# Patient Record
Sex: MALE | Race: OTHER | NOT HISPANIC OR LATINO | URBAN - METROPOLITAN AREA
[De-identification: names, ages, dates, MRNs, and addresses within clinical notes are randomized per-mention and may not be internally consistent; named-entity substitution may affect disease eponyms.]

---

## 2022-11-01 ENCOUNTER — EMERGENCY (EMERGENCY)
Facility: HOSPITAL | Age: 87
LOS: 1 days | Discharge: HOME | End: 2022-11-01
Attending: EMERGENCY MEDICINE | Admitting: EMERGENCY MEDICINE
Payer: MEDICARE

## 2022-11-01 VITALS
TEMPERATURE: 98 F | RESPIRATION RATE: 18 BRPM | OXYGEN SATURATION: 98 % | DIASTOLIC BLOOD PRESSURE: 68 MMHG | SYSTOLIC BLOOD PRESSURE: 142 MMHG | HEART RATE: 84 BPM

## 2022-11-01 VITALS
DIASTOLIC BLOOD PRESSURE: 70 MMHG | HEIGHT: 66 IN | RESPIRATION RATE: 18 BRPM | TEMPERATURE: 98 F | WEIGHT: 139.99 LBS | HEART RATE: 100 BPM | SYSTOLIC BLOOD PRESSURE: 152 MMHG | OXYGEN SATURATION: 97 %

## 2022-11-01 LAB
BASOPHILS # BLD AUTO: 0.04 K/UL — SIGNIFICANT CHANGE UP (ref 0–0.2)
BASOPHILS NFR BLD AUTO: 0.8 % — SIGNIFICANT CHANGE UP (ref 0–2)
EOSINOPHIL # BLD AUTO: 0.06 K/UL — SIGNIFICANT CHANGE UP (ref 0–0.5)
EOSINOPHIL NFR BLD AUTO: 1.2 % — SIGNIFICANT CHANGE UP (ref 0–6)
HCT VFR BLD CALC: 31.1 % — LOW (ref 39–50)
HGB BLD-MCNC: 10.3 G/DL — LOW (ref 13–17)
IMM GRANULOCYTES NFR BLD AUTO: 0.4 % — SIGNIFICANT CHANGE UP (ref 0–0.9)
INR BLD: 2.07 RATIO — HIGH (ref 0.88–1.16)
LYMPHOCYTES # BLD AUTO: 0.76 K/UL — LOW (ref 1–3.3)
LYMPHOCYTES # BLD AUTO: 15.4 % — SIGNIFICANT CHANGE UP (ref 13–44)
MCHC RBC-ENTMCNC: 33.1 GM/DL — SIGNIFICANT CHANGE UP (ref 32–36)
MCHC RBC-ENTMCNC: 34.1 PG — HIGH (ref 27–34)
MCV RBC AUTO: 103 FL — HIGH (ref 80–100)
MONOCYTES # BLD AUTO: 0.46 K/UL — SIGNIFICANT CHANGE UP (ref 0–0.9)
MONOCYTES NFR BLD AUTO: 9.3 % — SIGNIFICANT CHANGE UP (ref 2–14)
NEUTROPHILS # BLD AUTO: 3.61 K/UL — SIGNIFICANT CHANGE UP (ref 1.8–7.4)
NEUTROPHILS NFR BLD AUTO: 72.9 % — SIGNIFICANT CHANGE UP (ref 43–77)
NRBC # BLD: 0 /100 WBCS — SIGNIFICANT CHANGE UP (ref 0–0)
PLATELET # BLD AUTO: 129 K/UL — LOW (ref 150–400)
PROTHROM AB SERPL-ACNC: 24.4 SEC — HIGH (ref 10.5–13.4)
RBC # BLD: 3.02 M/UL — LOW (ref 4.2–5.8)
RBC # FLD: 14.6 % — HIGH (ref 10.3–14.5)
WBC # BLD: 4.95 K/UL — SIGNIFICANT CHANGE UP (ref 3.8–10.5)
WBC # FLD AUTO: 4.95 K/UL — SIGNIFICANT CHANGE UP (ref 3.8–10.5)

## 2022-11-01 PROCEDURE — 99283 EMERGENCY DEPT VISIT LOW MDM: CPT

## 2022-11-01 PROCEDURE — 85610 PROTHROMBIN TIME: CPT

## 2022-11-01 PROCEDURE — 85025 COMPLETE CBC W/AUTO DIFF WBC: CPT

## 2022-11-01 PROCEDURE — 99284 EMERGENCY DEPT VISIT MOD MDM: CPT | Mod: FS

## 2022-11-01 PROCEDURE — 36415 COLL VENOUS BLD VENIPUNCTURE: CPT

## 2022-11-01 RX ORDER — TRANEXAMIC ACID 100 MG/ML
5 INJECTION, SOLUTION INTRAVENOUS ONCE
Refills: 0 | Status: DISCONTINUED | OUTPATIENT
Start: 2022-11-01 | End: 2022-11-04

## 2022-11-01 RX ORDER — OXYMETAZOLINE HYDROCHLORIDE 0.5 MG/ML
1 SPRAY NASAL ONCE
Refills: 0 | Status: COMPLETED | OUTPATIENT
Start: 2022-11-01 | End: 2022-11-01

## 2022-11-01 RX ADMIN — OXYMETAZOLINE HYDROCHLORIDE 1 SPRAY(S): 0.5 SPRAY NASAL at 08:48

## 2022-11-01 NOTE — ED ADULT NURSE NOTE - NS ED NOTE  TALK SOMEONE YN
"Chief Complaint   Patient presents with     Anxiety       Initial /75 (BP Location: Right arm, Patient Position: Sitting, Cuff Size: Adult Regular)  Pulse 109  Temp 99  F (37.2  C) (Tympanic)  Ht 5' 2\" (1.575 m)  Wt 119 lb 8 oz (54.2 kg)  BMI 21.86 kg/m2 Estimated body mass index is 21.86 kg/(m^2) as calculated from the following:    Height as of this encounter: 5' 2\" (1.575 m).    Weight as of this encounter: 119 lb 8 oz (54.2 kg).  Medication Reconciliation: complete   Renea Alas CMA  " Stable No

## 2022-11-01 NOTE — ED PROVIDER NOTE - PATIENT PORTAL LINK FT
You can access the FollowMyHealth Patient Portal offered by [facility] by registering at the following website: [email or web address]. By joining [employer]’s FollowMyHealth portal, you will also be able to view your health information using other applications (apps) compatible with our system.

## 2022-11-01 NOTE — ED PROVIDER NOTE - PROGRESS NOTE DETAILS
epistaxis resolved  afrin applied  did not need txa or packing   labs noted, discussed with pt and son in law Mr. More  Discussed the results of all diagnostic testing in ED and copies of all reports given.   Pt was given an opportunity to have all questions answered to satisfaction.  Discussed the importance of prompt, close medical follow-up. ED return precautions discussed at length.  Pt verbalizes agreement and understanding of plan and ED return precautions. Pt well appearing, stable for DC home. No emergent concerns at this time.

## 2022-11-01 NOTE — ED PROVIDER NOTE - ATTENDING APP SHARED VISIT CONTRIBUTION OF CARE
Patient came into the ED with family c/o nose bleed x 2 hours. states it started last night for a few minutes and resolved then started again today and persisted. +blood clots. is on coumadin. no lightheaded, weakness.     A&Ox3, currently no active bleeding. +dry blood in right nostril. no septal hematoma. \    will observe in the ED while awaiting labs. will pack if bleeding restarts.

## 2022-11-01 NOTE — ED PROVIDER NOTE - CARE PROVIDER_API CALL
Mina Renee)  Otolaryngology  [address], [address]  [place] [address]  Phone: [phone]  Fax: [phone]  Follow Up Time:

## 2022-11-01 NOTE — ED ADULT NURSE NOTE - NSFALLRSKUNASSIST_ED_ALL_ED
Care manager called Barbi for monthly assessment;. Message left on voicemail requesting a call back. She immediately returned my call.    Her voice is raspy. She said she is doing ok. She can't say good. Her pain is controlled using the tramadol. She started to mention a medication she is supposed to take and it being expensive, and in the middle of our conversation, her daughter was calling and she took the call.  I will call back at another time to continue conversation.    Alexandria Barrow, RN, BSN  Care manager  
no

## 2022-11-01 NOTE — ED PROVIDER NOTE - NSFOLLOWUPINSTRUCTIONS_ED_ALL_ED_FT
Nosebleed, Adult      A nosebleed is when blood comes out of the nose. Nosebleeds are common. Usually, they are not a sign of a serious condition.    Nosebleeds can happen if a blood vessel in your nose starts to bleed or if the lining of your nose (mucous membrane) cracks. They are commonly caused by:  •Allergies.      •Colds.      •Picking your nose.      •Blowing your nose too hard.      •An injury from sticking an object into your nose or getting hit in the nose.      •Dry or cold air.      Less common causes of nosebleeds include:  •Toxic fumes.      •Something abnormal in the nose or in the air-filled spaces in the bones of the face (sinuses).      •Growths in the nose, such as polyps.      •Blood thinners or conditions that cause blood to clot slowly.      •Certain illnesses or procedures that irritate or dry out the nasal passages.        Follow these instructions at home:      When you have a nosebleed:      •Sit down and tilt your head slightly forward.      •Use a clean towel or tissue to pinch your nostrils under the bony part of your nose. After 5 minutes, let go of your nose and see if bleeding starts again. Do not release pressure before that time. If there is still bleeding, repeat the pinching and holding for 5 minutes or until the bleeding stops.      • Do not place tissues or gauze in the nose to stop the bleeding.      •Avoid lying down and avoid tilting your head backward. That may make blood collect in the throat and cause gagging or coughing.      •Use a nasal spray decongestant to help with a nosebleed as told by your health care provider.      After a nosebleed:     •Avoid blowing your nose or sniffing for a number of hours.      •Avoid straining, lifting, or bending at the waist for several days. You may go back to other normal activities as you are able.    •If you are taking aspirin or blood thinners and you have nosebleeds, talk to your health care provider. These medicines make bleeding more likely.  •Ask your health care provider if you should stop taking the medicines or if you should adjust the dose.      •Do not stop taking medicines that your health care provider has recommended unless he or she tells you to stop taking them.      •If your nosebleed was caused by dry mucous membranes, use over-the-counter saline nasal spray or gel and a humidifier as told by your health care provider. This will keep the mucous membranes moist and allow them to heal. If you need to use one of these products:  •Choose one that is water-soluble.      •Use only as much as you need and use it only as often as needed.      •Do not lie down right after you use it.      •If you get nosebleeds often, talk with your health care provider about medical treatments. Options may include:  •Nasal cautery. This treatment stops and prevents nosebleeds by using a chemical swab or electrical device to lightly burn tiny blood vessels inside the nose.      •Nasal packing. A gauze or other material is placed in the nose to keep constant pressure on the bleeding area.          Contact a health care provider if you:    •Have a fever.      •Get nosebleeds often or more often than usual.      •Bruise very easily.      •Have a nosebleed from having something stuck in your nose.      •Have bleeding in your mouth.      •Vomit or cough up brown material.      •Have a nosebleed after you start a new medicine.        Get help right away if:    •You have a nosebleed after a fall or a head injury.      •Your nosebleed does not go away after 20 minutes.      •You feel dizzy or weak.      •You have unusual bleeding from other parts of your body.      •You have unusual bruising on other parts of your body.      •You become sweaty.      •You vomit blood.        Summary    •A nosebleed is when blood comes out of the nose. Common causes include allergies, an injury to the nose, or cold or dry air.      •Initial treatment includes applying pressure for 5 minutes.       •Moisturizing the nose with saline nasal spray or gel after a nosebleed may help prevent future bleeding.      •Get help right away if your nosebleed does not go away after 20 minutes.      This information is not intended to replace advice given to you by your health care provider. Make sure you discuss any questions you have with your health care provider.      Document Revised: 10/15/2020 Document Reviewed: 10/15/2020    [employer] Patient Education © 2022 [employer] Do not blow your nose.  Afrin 2 sprays each nostril for 1 week  Follow up with your ENT in 1-2 days.  PCP within 1 week  Return to the ED immediately for new or worsening symptoms as we discussed.      Nosebleed, Adult      A nosebleed is when blood comes out of the nose. Nosebleeds are common. Usually, they are not a sign of a serious condition.    Nosebleeds can happen if a blood vessel in your nose starts to bleed or if the lining of your nose (mucous membrane) cracks. They are commonly caused by:  •Allergies.      •Colds.      •Picking your nose.      •Blowing your nose too hard.      •An injury from sticking an object into your nose or getting hit in the nose.      •Dry or cold air.      Less common causes of nosebleeds include:  •Toxic fumes.      •Something abnormal in the nose or in the air-filled spaces in the bones of the face (sinuses).      •Growths in the nose, such as polyps.      •Blood thinners or conditions that cause blood to clot slowly.      •Certain illnesses or procedures that irritate or dry out the nasal passages.        Follow these instructions at home:      When you have a nosebleed:      •Sit down and tilt your head slightly forward.      •Use a clean towel or tissue to pinch your nostrils under the bony part of your nose. After 5 minutes, let go of your nose and see if bleeding starts again. Do not release pressure before that time. If there is still bleeding, repeat the pinching and holding for 5 minutes or until the bleeding stops.      • Do not place tissues or gauze in the nose to stop the bleeding.      •Avoid lying down and avoid tilting your head backward. That may make blood collect in the throat and cause gagging or coughing.      •Use a nasal spray decongestant to help with a nosebleed as told by your health care provider.      After a nosebleed:     •Avoid blowing your nose or sniffing for a number of hours.      •Avoid straining, lifting, or bending at the waist for several days. You may go back to other normal activities as you are able.    •If you are taking aspirin or blood thinners and you have nosebleeds, talk to your health care provider. These medicines make bleeding more likely.  •Ask your health care provider if you should stop taking the medicines or if you should adjust the dose.      •Do not stop taking medicines that your health care provider has recommended unless he or she tells you to stop taking them.      •If your nosebleed was caused by dry mucous membranes, use over-the-counter saline nasal spray or gel and a humidifier as told by your health care provider. This will keep the mucous membranes moist and allow them to heal. If you need to use one of these products:  •Choose one that is water-soluble.      •Use only as much as you need and use it only as often as needed.      •Do not lie down right after you use it.      •If you get nosebleeds often, talk with your health care provider about medical treatments. Options may include:  •Nasal cautery. This treatment stops and prevents nosebleeds by using a chemical swab or electrical device to lightly burn tiny blood vessels inside the nose.      •Nasal packing. A gauze or other material is placed in the nose to keep constant pressure on the bleeding area.          Contact a health care provider if you:    •Have a fever.      •Get nosebleeds often or more often than usual.      •Bruise very easily.      •Have a nosebleed from having something stuck in your nose.      •Have bleeding in your mouth.      •Vomit or cough up brown material.      •Have a nosebleed after you start a new medicine.        Get help right away if:    •You have a nosebleed after a fall or a head injury.      •Your nosebleed does not go away after 20 minutes.      •You feel dizzy or weak.      •You have unusual bleeding from other parts of your body.      •You have unusual bruising on other parts of your body.      •You become sweaty.      •You vomit blood.        Summary    •A nosebleed is when blood comes out of the nose. Common causes include allergies, an injury to the nose, or cold or dry air.      •Initial treatment includes applying pressure for 5 minutes.       •Moisturizing the nose with saline nasal spray or gel after a nosebleed may help prevent future bleeding.      •Get help right away if your nosebleed does not go away after 20 minutes.      This information is not intended to replace advice given to you by your health care provider. Make sure you discuss any questions you have with your health care provider.      Document Revised: 10/15/2020 Document Reviewed: 10/15/2020    [employer] Patient Education © 2022 [employer]

## 2022-11-01 NOTE — ED ADULT NURSE NOTE - OBJECTIVE STATEMENT
received pt with epistaxis, pt denies injury/trauma.   bleeding well controlled with pressure at this time.   Respirations even and unlabored denies n/v.    previously assessed by ed provider and afrin administered as documented. BN

## 2022-11-01 NOTE — ED PROVIDER NOTE - OBJECTIVE STATEMENT
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 Pt reports epistaxis last night for just a few mins, resolved with pressure. Pt denies HA, dizziness, palpitations, SOB, fever/chills, cough.

## 2022-12-28 ENCOUNTER — OFFICE (OUTPATIENT)
Dept: URBAN - METROPOLITAN AREA CLINIC 109 | Facility: CLINIC | Age: 87
Setting detail: OPHTHALMOLOGY
End: 2022-12-28
Payer: MEDICARE

## 2022-12-28 DIAGNOSIS — H02.831: ICD-10-CM

## 2022-12-28 DIAGNOSIS — H02.835: ICD-10-CM

## 2022-12-28 DIAGNOSIS — H02.403: ICD-10-CM

## 2022-12-28 DIAGNOSIS — H16.222: ICD-10-CM

## 2022-12-28 DIAGNOSIS — H16.223: ICD-10-CM

## 2022-12-28 DIAGNOSIS — H02.834: ICD-10-CM

## 2022-12-28 DIAGNOSIS — H35.3131: ICD-10-CM

## 2022-12-28 DIAGNOSIS — H02.832: ICD-10-CM

## 2022-12-28 DIAGNOSIS — H16.221: ICD-10-CM

## 2022-12-28 PROCEDURE — 99213 OFFICE O/P EST LOW 20 MIN: CPT | Performed by: OPHTHALMOLOGY

## 2022-12-28 ASSESSMENT — LID POSITION - PTOSIS
OS_PTOSIS: LUL 1+ 2+
OD_PTOSIS: RUL 1+ 2+

## 2022-12-28 ASSESSMENT — VISUAL ACUITY
OS_BCVA: 20/20-1
OD_BCVA: 20/20

## 2022-12-28 ASSESSMENT — SUPERFICIAL PUNCTATE KERATITIS (SPK)
OS_SPK: ABSENT
OD_SPK: ABSENT

## 2022-12-28 ASSESSMENT — TONOMETRY
OD_IOP_MMHG: 13
OS_IOP_MMHG: 14

## 2022-12-28 ASSESSMENT — LID POSITION - COMMENTS
OD_COMMENTS: GOOD LEVATOR FUNCTION
OS_COMMENTS: GOOD LEVATOR FUNCTION

## 2022-12-28 ASSESSMENT — LID POSITION - DERMATOCHALASIS
OS_DERMATOCHALASIS: 1+
OD_DERMATOCHALASIS: 1+

## 2022-12-28 ASSESSMENT — CONFRONTATIONAL VISUAL FIELD TEST (CVF)
OD_FINDINGS: FULL
OS_FINDINGS: FULL

## 2023-04-26 NOTE — ED PROVIDER NOTE - NSDCPRINTRESULTS_ED_ALL_ED
Drysol Pregnancy And Lactation Text: This medication is considered safe during pregnancy and breast feeding. Patient requests all Lab, Cardiology, and Radiology Results on their Discharge Instructions

## 2023-06-28 ENCOUNTER — OFFICE (OUTPATIENT)
Dept: URBAN - METROPOLITAN AREA CLINIC 109 | Facility: CLINIC | Age: 88
Setting detail: OPHTHALMOLOGY
End: 2023-06-28
Payer: MEDICARE

## 2023-06-28 DIAGNOSIS — H02.403: ICD-10-CM

## 2023-06-28 DIAGNOSIS — H16.223: ICD-10-CM

## 2023-06-28 DIAGNOSIS — H02.835: ICD-10-CM

## 2023-06-28 DIAGNOSIS — H16.222: ICD-10-CM

## 2023-06-28 DIAGNOSIS — H35.3131: ICD-10-CM

## 2023-06-28 DIAGNOSIS — H02.834: ICD-10-CM

## 2023-06-28 DIAGNOSIS — H02.832: ICD-10-CM

## 2023-06-28 DIAGNOSIS — H16.221: ICD-10-CM

## 2023-06-28 DIAGNOSIS — H02.831: ICD-10-CM

## 2023-06-28 PROCEDURE — 99213 OFFICE O/P EST LOW 20 MIN: CPT | Performed by: OPHTHALMOLOGY

## 2023-06-28 ASSESSMENT — SUPERFICIAL PUNCTATE KERATITIS (SPK)
OS_SPK: ABSENT
OD_SPK: ABSENT

## 2023-06-28 ASSESSMENT — VISUAL ACUITY
OD_BCVA: 20/20-2
OS_BCVA: 20/20-1

## 2023-06-28 ASSESSMENT — CONFRONTATIONAL VISUAL FIELD TEST (CVF)
OS_FINDINGS: FULL
OD_FINDINGS: FULL

## 2023-06-28 ASSESSMENT — TONOMETRY
OD_IOP_MMHG: 11
OS_IOP_MMHG: 11

## 2023-06-28 ASSESSMENT — LID POSITION - COMMENTS
OD_COMMENTS: GOOD LEVATOR FUNCTION
OS_COMMENTS: GOOD LEVATOR FUNCTION

## 2023-06-28 ASSESSMENT — LID POSITION - DERMATOCHALASIS
OS_DERMATOCHALASIS: 1+
OD_DERMATOCHALASIS: 1+

## 2023-06-28 ASSESSMENT — LID POSITION - PTOSIS
OD_PTOSIS: RUL 1+ 2+
OS_PTOSIS: LUL 1+ 2+

## 2023-12-19 ENCOUNTER — OFFICE (OUTPATIENT)
Dept: URBAN - METROPOLITAN AREA CLINIC 109 | Facility: CLINIC | Age: 88
Setting detail: OPHTHALMOLOGY
End: 2023-12-19
Payer: MEDICARE

## 2023-12-19 DIAGNOSIS — H16.223: ICD-10-CM

## 2023-12-19 DIAGNOSIS — H16.222: ICD-10-CM

## 2023-12-19 DIAGNOSIS — H16.221: ICD-10-CM

## 2023-12-19 DIAGNOSIS — H02.831: ICD-10-CM

## 2023-12-19 DIAGNOSIS — H35.3131: ICD-10-CM

## 2023-12-19 DIAGNOSIS — H02.403: ICD-10-CM

## 2023-12-19 DIAGNOSIS — H02.835: ICD-10-CM

## 2023-12-19 DIAGNOSIS — H02.834: ICD-10-CM

## 2023-12-19 DIAGNOSIS — H02.832: ICD-10-CM

## 2023-12-19 PROCEDURE — 99213 OFFICE O/P EST LOW 20 MIN: CPT | Performed by: OPHTHALMOLOGY

## 2023-12-19 ASSESSMENT — LID POSITION - COMMENTS
OD_COMMENTS: GOOD LEVATOR FUNCTION
OS_COMMENTS: GOOD LEVATOR FUNCTION

## 2023-12-19 ASSESSMENT — REFRACTION_AUTOREFRACTION
OD_SPHERE: +0.50
OD_AXIS: 012
OD_CYLINDER: -1.00
OS_AXIS: 097
OS_SPHERE: +0.75
OS_CYLINDER: -0.75

## 2023-12-19 ASSESSMENT — SUPERFICIAL PUNCTATE KERATITIS (SPK)
OS_SPK: ABSENT
OD_SPK: ABSENT

## 2023-12-19 ASSESSMENT — LID POSITION - PTOSIS
OD_PTOSIS: RUL 1+ 2+
OS_PTOSIS: LUL 1+ 2+

## 2023-12-19 ASSESSMENT — LID POSITION - DERMATOCHALASIS
OS_DERMATOCHALASIS: 1+
OD_DERMATOCHALASIS: 1+

## 2023-12-19 ASSESSMENT — CONFRONTATIONAL VISUAL FIELD TEST (CVF)
OD_FINDINGS: FULL
OS_FINDINGS: FULL

## 2023-12-19 ASSESSMENT — SPHEQUIV_DERIVED
OS_SPHEQUIV: 0.375
OD_SPHEQUIV: 0

## 2024-05-09 ENCOUNTER — APPOINTMENT (OUTPATIENT)
Dept: CARDIOTHORACIC SURGERY | Facility: CLINIC | Age: 89
End: 2024-05-09

## 2024-05-09 PROBLEM — Z00.00 ENCOUNTER FOR PREVENTIVE HEALTH EXAMINATION: Status: ACTIVE | Noted: 2024-05-09

## 2024-06-15 PROBLEM — I10 ESSENTIAL (PRIMARY) HYPERTENSION: Chronic | Status: ACTIVE | Noted: 2022-11-01

## 2024-06-15 PROBLEM — N40.0 BENIGN PROSTATIC HYPERPLASIA WITHOUT LOWER URINARY TRACT SYMPTOMS: Chronic | Status: ACTIVE | Noted: 2022-11-01

## 2024-06-18 ENCOUNTER — OFFICE (OUTPATIENT)
Dept: URBAN - METROPOLITAN AREA CLINIC 109 | Facility: CLINIC | Age: 89
Setting detail: OPHTHALMOLOGY
End: 2024-06-18
Payer: MEDICARE

## 2024-06-18 DIAGNOSIS — H16.223: ICD-10-CM

## 2024-06-18 DIAGNOSIS — H16.221: ICD-10-CM

## 2024-06-18 DIAGNOSIS — H02.403: ICD-10-CM

## 2024-06-18 DIAGNOSIS — H16.222: ICD-10-CM

## 2024-06-18 DIAGNOSIS — H35.3131: ICD-10-CM

## 2024-06-18 DIAGNOSIS — H02.834: ICD-10-CM

## 2024-06-18 DIAGNOSIS — H02.831: ICD-10-CM

## 2024-06-18 DIAGNOSIS — H02.835: ICD-10-CM

## 2024-06-18 DIAGNOSIS — H02.832: ICD-10-CM

## 2024-06-18 PROCEDURE — 99213 OFFICE O/P EST LOW 20 MIN: CPT | Performed by: OPHTHALMOLOGY

## 2024-06-18 ASSESSMENT — LID POSITION - COMMENTS
OS_COMMENTS: GOOD LEVATOR FUNCTION
OD_COMMENTS: GOOD LEVATOR FUNCTION

## 2024-06-18 ASSESSMENT — CONFRONTATIONAL VISUAL FIELD TEST (CVF)
OD_FINDINGS: FULL
OS_FINDINGS: FULL

## 2024-06-18 ASSESSMENT — LID POSITION - DERMATOCHALASIS
OS_DERMATOCHALASIS: 1+
OD_DERMATOCHALASIS: 1+

## 2024-06-18 ASSESSMENT — LID POSITION - PTOSIS
OS_PTOSIS: LUL 1+ 2+
OD_PTOSIS: RUL 1+ 2+

## 2024-06-20 ENCOUNTER — APPOINTMENT (OUTPATIENT)
Dept: CV DIAGNOSITCS | Facility: HOSPITAL | Age: 89
End: 2024-06-20

## 2024-06-20 ENCOUNTER — RESULT REVIEW (OUTPATIENT)
Age: 89
End: 2024-06-20

## 2024-06-20 ENCOUNTER — OUTPATIENT (OUTPATIENT)
Dept: OUTPATIENT SERVICES | Facility: HOSPITAL | Age: 89
LOS: 1 days | End: 2024-06-20
Payer: MEDICARE

## 2024-06-20 VITALS
DIASTOLIC BLOOD PRESSURE: 72 MMHG | OXYGEN SATURATION: 97 % | SYSTOLIC BLOOD PRESSURE: 152 MMHG | HEIGHT: 64 IN | WEIGHT: 139.99 LBS | RESPIRATION RATE: 16 BRPM | HEART RATE: 74 BPM

## 2024-06-20 VITALS
SYSTOLIC BLOOD PRESSURE: 166 MMHG | RESPIRATION RATE: 14 BRPM | OXYGEN SATURATION: 98 % | HEART RATE: 66 BPM | DIASTOLIC BLOOD PRESSURE: 72 MMHG

## 2024-06-20 DIAGNOSIS — I34.0 NONRHEUMATIC MITRAL (VALVE) INSUFFICIENCY: ICD-10-CM

## 2024-06-20 PROCEDURE — 76377 3D RENDER W/INTRP POSTPROCES: CPT

## 2024-06-20 PROCEDURE — 76377 3D RENDER W/INTRP POSTPROCES: CPT | Mod: 26

## 2024-06-20 PROCEDURE — 93325 DOPPLER ECHO COLOR FLOW MAPG: CPT | Mod: 26

## 2024-06-20 PROCEDURE — 93312 ECHO TRANSESOPHAGEAL: CPT

## 2024-06-20 PROCEDURE — 93320 DOPPLER ECHO COMPLETE: CPT

## 2024-06-20 PROCEDURE — 93325 DOPPLER ECHO COLOR FLOW MAPG: CPT

## 2024-06-20 PROCEDURE — 93312 ECHO TRANSESOPHAGEAL: CPT | Mod: 26

## 2024-06-20 PROCEDURE — 93320 DOPPLER ECHO COMPLETE: CPT | Mod: 26

## 2024-06-26 PROBLEM — N40.1 BPH ASSOCIATED WITH NOCTURIA: Status: ACTIVE | Noted: 2024-06-26

## 2024-06-26 PROBLEM — N18.9 CKD (CHRONIC KIDNEY DISEASE): Status: ACTIVE | Noted: 2024-06-26

## 2024-06-26 PROBLEM — Z78.9 SOCIAL ALCOHOL USE: Status: ACTIVE | Noted: 2024-06-26

## 2024-06-26 PROBLEM — I34.0 MITRAL VALVE INSUFFICIENCY, UNSPECIFIED ETIOLOGY: Status: ACTIVE | Noted: 2024-06-18

## 2024-06-26 PROBLEM — Z86.711 HISTORY OF PULMONARY EMBOLISM: Status: RESOLVED | Noted: 2024-06-26 | Resolved: 2024-06-26

## 2024-06-26 PROBLEM — Z80.0 FAMILY HISTORY OF MALIGNANT NEOPLASM OF COLON: Status: ACTIVE | Noted: 2024-06-26

## 2024-06-26 PROBLEM — Z87.891 FORMER SMOKER: Status: ACTIVE | Noted: 2024-06-26

## 2024-06-26 RX ORDER — LISINOPRIL 20 MG/1
20 TABLET ORAL DAILY
Qty: 90 | Refills: 0 | Status: ACTIVE | COMMUNITY

## 2024-06-26 RX ORDER — TERAZOSIN 5 MG/1
5 CAPSULE ORAL
Refills: 0 | Status: ACTIVE | COMMUNITY

## 2024-06-26 RX ORDER — ATENOLOL 25 MG/1
25 TABLET ORAL DAILY
Qty: 30 | Refills: 0 | Status: ACTIVE | COMMUNITY

## 2024-06-26 RX ORDER — OMEPRAZOLE 20 MG/1
20 CAPSULE, DELAYED RELEASE ORAL
Qty: 90 | Refills: 0 | Status: ACTIVE | COMMUNITY

## 2024-06-27 ENCOUNTER — APPOINTMENT (OUTPATIENT)
Dept: CARDIOTHORACIC SURGERY | Facility: CLINIC | Age: 89
End: 2024-06-27
Payer: MEDICARE

## 2024-06-27 VITALS
OXYGEN SATURATION: 95 % | RESPIRATION RATE: 14 BRPM | HEIGHT: 64 IN | HEART RATE: 61 BPM | BODY MASS INDEX: 23.9 KG/M2 | DIASTOLIC BLOOD PRESSURE: 65 MMHG | SYSTOLIC BLOOD PRESSURE: 127 MMHG | WEIGHT: 140 LBS | TEMPERATURE: 97.2 F

## 2024-06-27 DIAGNOSIS — N18.9 CHRONIC KIDNEY DISEASE, UNSPECIFIED: ICD-10-CM

## 2024-06-27 DIAGNOSIS — I34.0 NONRHEUMATIC MITRAL (VALVE) INSUFFICIENCY: ICD-10-CM

## 2024-06-27 DIAGNOSIS — Z78.9 OTHER SPECIFIED HEALTH STATUS: ICD-10-CM

## 2024-06-27 DIAGNOSIS — Z87.891 PERSONAL HISTORY OF NICOTINE DEPENDENCE: ICD-10-CM

## 2024-06-27 DIAGNOSIS — Z80.0 FAMILY HISTORY OF MALIGNANT NEOPLASM OF DIGESTIVE ORGANS: ICD-10-CM

## 2024-06-27 DIAGNOSIS — Z86.711 PERSONAL HISTORY OF PULMONARY EMBOLISM: ICD-10-CM

## 2024-06-27 DIAGNOSIS — N40.1 BENIGN PROSTATIC HYPERPLASIA WITH LOWER URINARY TRACT SYMPMS: ICD-10-CM

## 2024-06-27 DIAGNOSIS — R35.1 BENIGN PROSTATIC HYPERPLASIA WITH LOWER URINARY TRACT SYMPMS: ICD-10-CM

## 2024-06-27 PROCEDURE — 99204 OFFICE O/P NEW MOD 45 MIN: CPT

## 2024-07-22 ENCOUNTER — RESULT REVIEW (OUTPATIENT)
Age: 89
End: 2024-07-22

## 2024-07-22 RX ORDER — CEFUROXIME AXETIL 250 MG
1500 TABLET ORAL ONCE
Refills: 0 | Status: DISCONTINUED | OUTPATIENT
Start: 2024-07-31 | End: 2024-08-01

## 2024-07-22 RX ORDER — BACTERIOSTATIC SODIUM CHLORIDE 0.9 %
3 VIAL (ML) INJECTION EVERY 8 HOURS
Refills: 0 | Status: DISCONTINUED | OUTPATIENT
Start: 2024-07-31 | End: 2024-07-31

## 2024-07-22 RX ORDER — CHLORHEXIDINE GLUCONATE 500 MG/1
1 CLOTH TOPICAL ONCE
Refills: 0 | Status: DISCONTINUED | OUTPATIENT
Start: 2024-07-31 | End: 2024-07-31

## 2024-07-30 PROBLEM — Z79.01 LONG TERM (CURRENT) USE OF ANTICOAGULANTS: Chronic | Status: ACTIVE | Noted: 2024-07-22

## 2024-07-30 PROBLEM — I26.99 OTHER PULMONARY EMBOLISM WITHOUT ACUTE COR PULMONALE: Chronic | Status: ACTIVE | Noted: 2024-07-22

## 2024-07-30 PROBLEM — Z86.711 PERSONAL HISTORY OF PULMONARY EMBOLISM: Chronic | Status: ACTIVE | Noted: 2024-07-22

## 2024-07-30 PROBLEM — N18.9 CHRONIC KIDNEY DISEASE, UNSPECIFIED: Chronic | Status: ACTIVE | Noted: 2024-07-22

## 2024-07-31 ENCOUNTER — INPATIENT (INPATIENT)
Facility: HOSPITAL | Age: 89
LOS: 0 days | Discharge: HOME | DRG: 267 | End: 2024-08-01
Attending: THORACIC SURGERY (CARDIOTHORACIC VASCULAR SURGERY) | Admitting: THORACIC SURGERY (CARDIOTHORACIC VASCULAR SURGERY)
Payer: MEDICARE

## 2024-07-31 ENCOUNTER — RESULT REVIEW (OUTPATIENT)
Age: 89
End: 2024-07-31

## 2024-07-31 ENCOUNTER — APPOINTMENT (OUTPATIENT)
Dept: CARDIOTHORACIC SURGERY | Facility: HOSPITAL | Age: 89
End: 2024-07-31

## 2024-07-31 VITALS
WEIGHT: 135.8 LBS | SYSTOLIC BLOOD PRESSURE: 164 MMHG | DIASTOLIC BLOOD PRESSURE: 77 MMHG | OXYGEN SATURATION: 95 % | HEIGHT: 63.78 IN | RESPIRATION RATE: 17 BRPM | HEART RATE: 78 BPM | TEMPERATURE: 98 F

## 2024-07-31 DIAGNOSIS — Z98.890 OTHER SPECIFIED POSTPROCEDURAL STATES: Chronic | ICD-10-CM

## 2024-07-31 DIAGNOSIS — I34.0 NONRHEUMATIC MITRAL (VALVE) INSUFFICIENCY: ICD-10-CM

## 2024-07-31 DIAGNOSIS — Z95.828 PRESENCE OF OTHER VASCULAR IMPLANTS AND GRAFTS: Chronic | ICD-10-CM

## 2024-07-31 LAB
GLUCOSE BLDC GLUCOMTR-MCNC: 98 MG/DL — SIGNIFICANT CHANGE UP (ref 70–99)
INR BLD: 1.21 RATIO — HIGH (ref 0.85–1.18)
PROTHROM AB SERPL-ACNC: 13.2 SEC — HIGH (ref 9.5–13)

## 2024-07-31 PROCEDURE — 93010 ELECTROCARDIOGRAM REPORT: CPT

## 2024-07-31 PROCEDURE — 99232 SBSQ HOSP IP/OBS MODERATE 35: CPT | Mod: FS

## 2024-07-31 PROCEDURE — 93355 ECHO TRANSESOPHAGEAL (TEE): CPT

## 2024-07-31 DEVICE — SYS IMP PASCAL ACE G180266 STUDY ONLY: Type: IMPLANTABLE DEVICE | Status: FUNCTIONAL

## 2024-07-31 DEVICE — SHEATH INTRODUCER TERUMO PINNACLE CORONARY 8FR X 10CM X 0.038" MINI WIRE: Type: IMPLANTABLE DEVICE | Status: FUNCTIONAL

## 2024-07-31 DEVICE — KIT A-LINE 1LUM 20G X 12CM SAFE KIT: Type: IMPLANTABLE DEVICE | Status: FUNCTIONAL

## 2024-07-31 DEVICE — SHEATH INTRODUCER TERUMO PINNACLE CORONARY 5FR X 10CM X 0.038" MINI WIRE: Type: IMPLANTABLE DEVICE | Status: FUNCTIONAL

## 2024-07-31 DEVICE — IMPLANTABLE DEVICE: Type: IMPLANTABLE DEVICE | Status: FUNCTIONAL

## 2024-07-31 DEVICE — SHEATH GD WITH INTRODUCER: Type: IMPLANTABLE DEVICE | Status: FUNCTIONAL

## 2024-07-31 DEVICE — SYS IMP PASCAL ACE: Type: IMPLANTABLE DEVICE | Status: FUNCTIONAL

## 2024-07-31 RX ORDER — WARFARIN SODIUM 4 MG/1
5 TABLET ORAL ONCE
Refills: 0 | Status: COMPLETED | OUTPATIENT
Start: 2024-07-31 | End: 2024-07-31

## 2024-07-31 RX ORDER — CEFUROXIME AXETIL 250 MG
1500 TABLET ORAL EVERY 8 HOURS
Refills: 0 | Status: COMPLETED | OUTPATIENT
Start: 2024-08-01 | End: 2024-08-01

## 2024-07-31 RX ADMIN — Medication 3 MILLILITER(S): at 22:00

## 2024-07-31 RX ADMIN — WARFARIN SODIUM 5 MILLIGRAM(S): 4 TABLET ORAL at 21:46

## 2024-07-31 NOTE — PROGRESS NOTE ADULT - ASSESSMENT
This is a 89 year old male with past medical history of [place], HLD, PE maintained on Coumadin S/P Chiki filter (2008) managed by PCP every 3 weeks INR checks, tricupsid valve repair (2008), pulmonary artery thrombosis s/p thrombectomy (2008) and known MR followed by cardiologist, CKD, and BPH. Reports having noticed increasing SOB over the last few months and ankle swelling. Today presenting to PST accompanied with daughter for scheduled Mitral Clip on 7/31/24 with Dr. Casas    7/31 s/p Mitral jem    7/31: Now s/p 2 successful mitral clips (KENYETTA ace) via RFA. Hemostasis achieved w/ perstring, RRA a-line removed and radial band in place to be removed in CRS  - Post procedure EKG: accelerated junctional rhythm 74bpm  - s/p Zinacef intra-procedure; c/w Zinacef 1.5g q8hrs x2 doses (ordered)  - Coumadin 5mg to be given tonight (ordered)  - Ofirmev administered at 1800  - Remove RFA perstring in AM  - Resume home meds in AM if able to tolerate  - TTE/MCOT in AM This is a 89 year old male with past medical history of [place], HLD, PE maintained on Coumadin S/P Chiki filter (2008) managed by PCP every 3 weeks INR checks, tricupsid valve repair (2008), pulmonary artery thrombosis s/p thrombectomy (2008) and known MR followed by cardiologist, CKD, and BPH. Reports having noticed increasing SOB over the last few months and ankle swelling. Today presenting to PST accompanied with daughter for scheduled Mitral Clip on 7/31/24 with Dr. Casas    7/31 s/p 2 successful mitral clips (KENYETTA ace) via RFA. Hemostasis achieved w/ perstring, RRA a-line removed and radial band in place to be removed in CRS, Post procedure EKG: accelerated junctional rhythm 74bpm, s/p Zinacef intra-procedure; c/w Zinacef 1.5g q8hrs x2 doses (ordered), Coumadin 5mg to be given tonight (ordered) per CRS, RFA perstring to be removed in the AM, Resume home meds in AM if able to tolerate, TTE/EKG/MCOT in AM

## 2024-07-31 NOTE — PRE-ANESTHESIA EVALUATION ADULT - NSANTHPMHFT_GEN_ALL_CORE
This is a 89 year old male with past medical history of [place], HLD, PE maintained on Coumadin S/P Chiki filter (2008) managed by PCP every 3 weeks INR checks, tricupsid valve repair (2008), pulmonary artery thrombosis s/p thrombectomy (2008) and known MR followed by cardiologist, CKD, and BPH. Reports having noticed increasing SOB over the last few months and ankle swelling.

## 2024-07-31 NOTE — PATIENT PROFILE ADULT - ARRIVAL FROM
[FreeTextEntry1] : Urine HCG negative today; results given. \par Satisfied with BCM; tolerating well. \par Advised consistent condom use for STI prevention.\par Advised to f/u if heavy bleeding or prolonged bleeding > 2 weeks. \par Advised to rest left wrist, nsaid prn (declined today). RTC if pain persists in a week. 
Home

## 2024-07-31 NOTE — PROGRESS NOTE ADULT - SUBJECTIVE AND OBJECTIVE BOX
Subjective: ***     Telemetry:  ***  Overnight Events: no acute events    Vital Signs Last 24 Hrs  T(C): 36.6 (07-31-24 @ 19:50), Max: 36.6 (07-31-24 @ 19:50)  T(F): 97.8 (07-31-24 @ 19:50), Max: 97.8 (07-31-24 @ 19:50)  HR: 56 (07-31-24 @ 19:50) (56 - 78)  BP: 138/78 (07-31-24 @ 19:50) (137/65 - 164/77)  RR: 18 (07-31-24 @ 19:50) (14 - 18)  SpO2: 97% (07-31-24 @ 19:50) (93% - 98%)             Daily Height in cm: 162 (31 Jul 2024 15:15)    Daily   Admit Wt: Drug Dosing Weight  Height (cm): 162 (31 Jul 2024 15:15)  Weight (kg): 61.6 (31 Jul 2024 15:15)  BMI (kg/m2): 23.5 (31 Jul 2024 15:15)  BSA (m2): 1.66 (31 Jul 2024 15:15)    CAPILLARY BLOOD GLUCOSE    POCT Blood Glucose.: 98 mg/dL (31 Jul 2024 07:51)        cefuroxime  IVPB 1500 milliGRAM(s) IV Intermittent once  warfarin 5 milliGRAM(s) Oral once    PT/INR - ( 31 Jul 2024 08:21 )   PT: 13.2 sec;   INR: 1.21 ratio      PHYSICAL EXAM  Neurology: A&Ox3, NAD  CV : RRR+S1S2  Sternal Wound: MSI CDI , Stable  Lungs: Respirations non-labored, B/L BS clear  Abdomen: Soft, NT/ND, +BSx4Q   : DTV  Extremities: negative B/L LE edema, negative calf tenderness, +PP B/L, b/l groins soft no hematoma, Right groin dressign cdi w/ suture noted    Disposition:  TBD[  ]     Home[  ]     Rehab[  ]      OR[  ] Date:              Subjective: Patient seen and examine at bedside, Deniesa any current SOB, chest pains, headaches, abdominal pain, fevers, chills, N/V/D     Telemetry: SR 60's  Overnight Events: no acute events    Vital Signs Last 24 Hrs  T(C): 36.6 (07-31-24 @ 19:50), Max: 36.6 (07-31-24 @ 19:50)  T(F): 97.8 (07-31-24 @ 19:50), Max: 97.8 (07-31-24 @ 19:50)  HR: 56 (07-31-24 @ 19:50) (56 - 78)  BP: 138/78 (07-31-24 @ 19:50) (137/65 - 164/77)  RR: 18 (07-31-24 @ 19:50) (14 - 18)  SpO2: 97% (07-31-24 @ 19:50) (93% - 98%)             Daily Height in cm: 162 (31 Jul 2024 15:15)    Daily   Admit Wt: Drug Dosing Weight  Height (cm): 162 (31 Jul 2024 15:15)  Weight (kg): 61.6 (31 Jul 2024 15:15)  BMI (kg/m2): 23.5 (31 Jul 2024 15:15)  BSA (m2): 1.66 (31 Jul 2024 15:15)    CAPILLARY BLOOD GLUCOSE    POCT Blood Glucose.: 98 mg/dL (31 Jul 2024 07:51)        cefuroxime  IVPB 1500 milliGRAM(s) IV Intermittent once  warfarin 5 milliGRAM(s) Oral once    PT/INR - ( 31 Jul 2024 08:21 )   PT: 13.2 sec;   INR: 1.21 ratio      PHYSICAL EXAM  Neurology: A&Ox3, NAD  CV : RRR+S1S2  Sternal Wound: MSI CDI , Stable  Lungs: Respirations non-labored, B/L BS clear  Abdomen: Soft, NT/ND, +BSx4Q   : DTV  Extremities: negative B/L LE edema, negative calf tenderness, +PP B/L, b/l groins soft no hematoma, Right groin dressign cdi w/ suture noted    Disposition:  TBD[  ]     Home[  ]     Rehab[  ]      OR[  ] Date:

## 2024-07-31 NOTE — PROGRESS NOTE ADULT - PROBLEM SELECTOR PLAN 1
Zinacef x 2 more doses  Coumadin 5mg to be given tonight (ordered) per CRS  RFA perstring to be removed in the AM  Monitor b/l groins   Resume home meds in AM if able to tolerate  TTE/EKG/MCOT in AM

## 2024-07-31 NOTE — PROGRESS NOTE ADULT - SUBJECTIVE AND OBJECTIVE BOX
89 y/o [place] Male w/ PMHx of HLD, PE s/p Chiki filter (2008), s/p tricuspid valve repair 2008 c/b cardiac arrest 2/2 pulmonary artery thrombosis s/p thrombectomy, known MR, CKD, BPH presents for mitral clip.    Vital Signs Last 24 Hrs  T(C): 36.5 (31 Jul 2024 15:15), Max: 36.5 (31 Jul 2024 09:06)  T(F): 97.7 (31 Jul 2024 09:06), Max: 97.7 (31 Jul 2024 09:06)  HR: 72 (31 Jul 2024 18:50) (66 - 78)  BP: 143/64 (31 Jul 2024 18:50) (137/65 - 164/77)  BP(mean): --  RR: 16 (31 Jul 2024 18:50) (14 - 17)  SpO2: 93% (31 Jul 2024 18:50) (93% - 98%)    Parameters below as of 31 Jul 2024 18:50  Patient On (Oxygen Delivery Method): room air    Limited physical examination:  General: awake, alert  Vascular: right groin stable; soft and no hematoma; perstring in place; + pulses    Assessment:  7/31: Now s/p 2 successful mitral clips (KENYETTA ace) via RFA. Hemostasis achieved w/ perstring, RRA a-line removed and radial band in place to be removed in CRS  - s/p Zinacef intra-procedure; c/w Zinacef 1.5g q8hrs x2 doses (ordered)  - Coumadin 5mg to be given tonight (ordered)  - Ofirmev administered at 1800  - Remove RFA perstring in AM  - Resume home meds in AM if able to tolerate  - TTE/MCOT in AM    Rosy Flores PA-C  Invasive cardiology  [id number] 89 y/o [place] Male w/ PMHx of HLD, PE s/p Chiki filter (2008), s/p tricuspid valve repair 2008 c/b cardiac arrest 2/2 pulmonary artery thrombosis s/p thrombectomy, known MR, CKD, BPH presents for mitral clip.    Vital Signs Last 24 Hrs  T(C): 36.5 (31 Jul 2024 15:15), Max: 36.5 (31 Jul 2024 09:06)  T(F): 97.7 (31 Jul 2024 09:06), Max: 97.7 (31 Jul 2024 09:06)  HR: 72 (31 Jul 2024 18:50) (66 - 78)  BP: 143/64 (31 Jul 2024 18:50) (137/65 - 164/77)  BP(mean): --  RR: 16 (31 Jul 2024 18:50) (14 - 17)  SpO2: 93% (31 Jul 2024 18:50) (93% - 98%)    Parameters below as of 31 Jul 2024 18:50  Patient On (Oxygen Delivery Method): room air    Limited physical examination:  General: awake, alert  Vascular: right groin stable; soft and no hematoma; perstring in place; + pulses    Assessment:  7/31: Now s/p 2 successful mitral clips (KENYETTA ace) via RFA. Hemostasis achieved w/ perstring, RRA a-line removed and radial band in place to be removed in CRS  - Post procedure EKG: accelerated junctional rhythm 74bpm  - s/p Zinacef intra-procedure; c/w Zinacef 1.5g q8hrs x2 doses (ordered)  - Coumadin 5mg to be given tonight (ordered)  - Ofirmev administered at 1800  - Remove RFA perstring in AM  - Resume home meds in AM if able to tolerate  - TTE/MCOT in AM    ELADIO GreenC  Invasive cardiology  [phone]

## 2024-08-01 ENCOUNTER — TRANSCRIPTION ENCOUNTER (OUTPATIENT)
Age: 89
End: 2024-08-01

## 2024-08-01 ENCOUNTER — RESULT REVIEW (OUTPATIENT)
Age: 89
End: 2024-08-01

## 2024-08-01 VITALS
SYSTOLIC BLOOD PRESSURE: 133 MMHG | RESPIRATION RATE: 18 BRPM | TEMPERATURE: 99 F | OXYGEN SATURATION: 93 % | DIASTOLIC BLOOD PRESSURE: 64 MMHG | HEART RATE: 81 BPM

## 2024-08-01 DIAGNOSIS — Z98.890 OTHER SPECIFIED POSTPROCEDURAL STATES: ICD-10-CM

## 2024-08-01 LAB
ANION GAP SERPL CALC-SCNC: 11 MMOL/L — SIGNIFICANT CHANGE UP (ref 5–17)
BUN SERPL-MCNC: 23 MG/DL — SIGNIFICANT CHANGE UP (ref 7–23)
CALCIUM SERPL-MCNC: 9.1 MG/DL — SIGNIFICANT CHANGE UP (ref 8.4–10.5)
CHLORIDE SERPL-SCNC: 106 MMOL/L — SIGNIFICANT CHANGE UP (ref 96–108)
CO2 SERPL-SCNC: 23 MMOL/L — SIGNIFICANT CHANGE UP (ref 22–31)
CREAT SERPL-MCNC: 1.42 MG/DL — HIGH (ref 0.5–1.3)
EGFR: 46 ML/MIN/1.73M2 — LOW
GLUCOSE SERPL-MCNC: 87 MG/DL — SIGNIFICANT CHANGE UP (ref 70–99)
HCT VFR BLD CALC: 30.4 % — LOW (ref 39–50)
HGB BLD-MCNC: 9.7 G/DL — LOW (ref 13–17)
INR BLD: 1.31 RATIO — HIGH (ref 0.85–1.18)
MCHC RBC-ENTMCNC: 31.9 GM/DL — LOW (ref 32–36)
MCHC RBC-ENTMCNC: 32.1 PG — SIGNIFICANT CHANGE UP (ref 27–34)
MCV RBC AUTO: 100.7 FL — HIGH (ref 80–100)
NRBC # BLD: 0 /100 WBCS — SIGNIFICANT CHANGE UP (ref 0–0)
PLATELET # BLD AUTO: 114 K/UL — LOW (ref 150–400)
POTASSIUM SERPL-MCNC: 4.3 MMOL/L — SIGNIFICANT CHANGE UP (ref 3.5–5.3)
POTASSIUM SERPL-SCNC: 4.3 MMOL/L — SIGNIFICANT CHANGE UP (ref 3.5–5.3)
PROTHROM AB SERPL-ACNC: 13.6 SEC — HIGH (ref 9.5–13)
RBC # BLD: 3.02 M/UL — LOW (ref 4.2–5.8)
RBC # FLD: 16.6 % — HIGH (ref 10.3–14.5)
SODIUM SERPL-SCNC: 140 MMOL/L — SIGNIFICANT CHANGE UP (ref 135–145)
WBC # BLD: 5.42 K/UL — SIGNIFICANT CHANGE UP (ref 3.8–10.5)
WBC # FLD AUTO: 5.42 K/UL — SIGNIFICANT CHANGE UP (ref 3.8–10.5)

## 2024-08-01 PROCEDURE — 99238 HOSP IP/OBS DSCHRG MGMT 30/<: CPT

## 2024-08-01 PROCEDURE — 93010 ELECTROCARDIOGRAM REPORT: CPT

## 2024-08-01 PROCEDURE — 93306 TTE W/DOPPLER COMPLETE: CPT | Mod: 26

## 2024-08-01 RX ORDER — WARFARIN SODIUM 4 MG/1
5 TABLET ORAL ONCE
Refills: 0 | Status: COMPLETED | OUTPATIENT
Start: 2024-08-01 | End: 2024-08-01

## 2024-08-01 RX ADMIN — Medication 100 MILLIGRAM(S): at 10:20

## 2024-08-01 RX ADMIN — Medication 100 MILLIGRAM(S): at 02:28

## 2024-08-01 RX ADMIN — WARFARIN SODIUM 5 MILLIGRAM(S): 4 TABLET ORAL at 11:43

## 2024-08-01 NOTE — DISCHARGE NOTE PROVIDER - ATTENDING DISCHARGE PHYSICAL EXAMINATION:
Access site C/D/I.  Echo reviewed with patient.  Mean gradient across mitral valve 8 mmHG.  He will resume his BB and recheck this gradient as outpatient in 1 months' time.    Discharge instructions reviewed with patient including follow-up and care coordination.    Follow-up with me in 1 week for post-op check.  Follow-up in 1 month with echo.

## 2024-08-01 NOTE — DISCHARGE NOTE PROVIDER - NSDCCPCAREPLAN_GEN_ALL_CORE_FT
PRINCIPAL DISCHARGE DIAGNOSIS  Diagnosis: S/P mitral valve clip implantation  Assessment and Plan of Treatment: shower daily  weigh yourself daily  continue current prescriptions as ordered  increase activity as tolerated   no added salt; low fat; low cholesterol, low salt diet.   follow up with Cardiologist interventionalist, Dr. Pedro in 1 week; office to call and schedule appointment. [phone]  follow up echocardiogram in 30 days at Dr. Pedro's office; office to call and schedule appointment.   remove right groin dressing in am 8/2

## 2024-08-01 NOTE — DISCHARGE NOTE NURSING/CASE MANAGEMENT/SOCIAL WORK - NSDCPEFALRISK_GEN_ALL_CORE
For information on Fall & Injury Prevention, visit: [email or web address] OR  [email or web address] OR  https://www.cdc.gov/steadi/patient.html

## 2024-08-01 NOTE — DISCHARGE NOTE PROVIDER - HOSPITAL COURSE
This is a 89 year old male with past medical history of [place], HLD, PE maintained on Coumadin S/P [place] filter (2008) managed by PCP every 3 weeks INR checks, tricupsid valve repair (2008), pulmonary artery thrombosis s/p thrombectomy (2008) and known MR followed by cardiologist, CKD, and BPH. Reports having noticed increasing SOB over the last few months and ankle swelling. Today presenting to [facility] accompanied with daughter for scheduled Mitral Clip on 7/31/24 with Dr. Casas    7/31 s/p 2 successful mitral clips (KENYETTA ace) via RFA. Hemostasis achieved w/ perstring, RRA a-line removed and radial band in place to be removed in CRS, Post procedure EKG: accelerated junctional rhythm 74bpm, s/p Zinacef intra-procedure; c/w Zinacef 1.5g q8hrs x2 doses (ordered), Coumadin 5mg to be given tonight (ordered) per CRS, RFA perstring to be removed in the AM, Resume home meds in AM if able to tolerate, TTE/EKG/MCOT in AM  8/1 VSS; RSR 60-80; rt groin stitch d/c this am--> neg bleeding noted; ekg done today--> RSR 60-80; echo done- mild MR; neg pericardial effusion  discharge pt home today as per Dr. Pedro; no mcot; f/u 1 week - Dr. Pedro's office to call and schedule appointment.

## 2024-08-01 NOTE — DISCHARGE NOTE PROVIDER - NSDCPNSUBOBJ_GEN_ALL_CORE
General: NAD  HEENT:  NC/AT  Neuro: A&Ox4, gait steady, speech clear, no focal deficits noted  Respiratory: B/L BS CTA, no wheeze, no rhonchi, no crackles noted  Cardiovascular: RSR 60-80; normal S1S2, no murmur noted  GI: Abd soft, NT/ND, +BSx4Q +BM  Peripheral Vascular:  B/L LE neg edema, 2+ peripheral pulses, no clubbing, cyanosis, varicosities/PVD noted  Musculoskeletal: B/L UE and LE 5/5 strength   Psychiatric: Normal mood, normal affect observed  Skin: Normal exam to inspection and palpation. no bleeding, no hematoma; rt groin cdi tasneem- neg hematoma/bleeding    Discharge pt home today 8/1 as per Dr. Pedro

## 2024-08-01 NOTE — DISCHARGE NOTE NURSING/CASE MANAGEMENT/SOCIAL WORK - PATIENT PORTAL LINK FT
You can access the FollowMyHealth Patient Portal offered by [facility] by registering at the following website: [email or web address]. By joining [facility]’s FollowMyHealth portal, you will also be able to view your health information using other applications (apps) compatible with our system.

## 2024-08-01 NOTE — DISCHARGE NOTE PROVIDER - NSDCMRMEDTOKEN_GEN_ALL_CORE_FT
atenolol 25 mg oral tablet: 1 tab(s) orally once a day  Lasix 40 mg oral tablet: 1 tab(s) orally  lisinopril 20 mg oral tablet: 1 tab(s) orally 2 times a day  terazosin 5 mg oral capsule: 1 cap(s) orally once a day (at bedtime)  warfarin 5 mg oral tablet: 1 tab(s) orally once a day  warfarin 7.5 mg oral tablet: 1 tab(s) orally once a day   atenolol 25 mg oral tablet: 1 tab(s) orally once a day  Lasix 40 mg oral tablet: 1 tab(s) orally once a day  lisinopril 20 mg oral tablet: 1 tab(s) orally 2 times a day  terazosin 5 mg oral capsule: 1 cap(s) orally once a day (at bedtime)  warfarin 5 mg oral tablet: 1 tab(s) orally 5 times a week resume preop schedule  warfarin 7.5 mg oral tablet: 1 tab(s) orally 2 times a week resume preop schedule

## 2024-08-01 NOTE — DISCHARGE NOTE PROVIDER - NSDCFUADDINST_GEN_ALL_CORE_FT
call Dr. Pedro for fever > 101   no driving until cleared by Dr. Pedro   follow up echocardiogram in

## 2024-08-01 NOTE — DISCHARGE NOTE PROVIDER - CARE PROVIDER_API CALL
Romain Pedro  Interventional Cardiology  [address], [address]  [place] [address]  Phone: [phone]  Fax: [phone]  Follow Up Time:

## 2024-09-29 PROCEDURE — 85027 COMPLETE CBC AUTOMATED: CPT

## 2024-09-29 PROCEDURE — C1894: CPT

## 2024-09-29 PROCEDURE — C1889: CPT

## 2024-09-29 PROCEDURE — L8699: CPT

## 2024-09-29 PROCEDURE — C1769: CPT

## 2024-09-29 PROCEDURE — 85610 PROTHROMBIN TIME: CPT

## 2024-09-29 PROCEDURE — 93355 ECHO TRANSESOPHAGEAL (TEE): CPT

## 2024-09-29 PROCEDURE — 80048 BASIC METABOLIC PNL TOTAL CA: CPT

## 2024-09-29 PROCEDURE — 93005 ELECTROCARDIOGRAM TRACING: CPT

## 2024-09-29 PROCEDURE — C9399: CPT

## 2024-09-29 PROCEDURE — 76000 FLUOROSCOPY <1 HR PHYS/QHP: CPT

## 2024-09-29 PROCEDURE — 82962 GLUCOSE BLOOD TEST: CPT

## 2024-09-29 PROCEDURE — 93306 TTE W/DOPPLER COMPLETE: CPT

## 2024-12-17 ENCOUNTER — OFFICE (OUTPATIENT)
Dept: URBAN - METROPOLITAN AREA CLINIC 109 | Facility: CLINIC | Age: 89
Setting detail: OPHTHALMOLOGY
End: 2024-12-17
Payer: MEDICARE

## 2024-12-17 DIAGNOSIS — H02.834: ICD-10-CM

## 2024-12-17 DIAGNOSIS — H16.221: ICD-10-CM

## 2024-12-17 DIAGNOSIS — H02.831: ICD-10-CM

## 2024-12-17 DIAGNOSIS — H18.413: ICD-10-CM

## 2024-12-17 DIAGNOSIS — G43.009: ICD-10-CM

## 2024-12-17 DIAGNOSIS — H16.222: ICD-10-CM

## 2024-12-17 DIAGNOSIS — H02.835: ICD-10-CM

## 2024-12-17 DIAGNOSIS — H35.3131: ICD-10-CM

## 2024-12-17 DIAGNOSIS — H02.832: ICD-10-CM

## 2024-12-17 DIAGNOSIS — H02.403: ICD-10-CM

## 2024-12-17 DIAGNOSIS — H16.223: ICD-10-CM

## 2024-12-17 PROCEDURE — 99213 OFFICE O/P EST LOW 20 MIN: CPT | Performed by: OPHTHALMOLOGY

## 2024-12-17 ASSESSMENT — VISUAL ACUITY
OD_BCVA: 20/20-2
OS_BCVA: 20/20

## 2024-12-17 ASSESSMENT — TONOMETRY
OD_IOP_MMHG: 15
OD_IOP_MMHG: 11
OS_IOP_MMHG: 17
OS_IOP_MMHG: 13

## 2024-12-17 ASSESSMENT — REFRACTION_AUTOREFRACTION
OS_SPHERE: 0.00
OD_CYLINDER: -1.50
OD_SPHERE: 0.00
OS_CYLINDER: -0.25
OD_AXIS: 15
OS_AXIS: 40

## 2024-12-17 ASSESSMENT — KERATOMETRY
OS_K2POWER_DIOPTERS: 43.50
OS_AXISANGLE_DEGREES: 065
OD_K2POWER_DIOPTERS: 43.00
OS_K1POWER_DIOPTERS: 43.00
OD_K1POWER_DIOPTERS: 43.00
OD_AXISANGLE_DEGREES: 090

## 2024-12-17 ASSESSMENT — CONFRONTATIONAL VISUAL FIELD TEST (CVF)
OS_FINDINGS: FULL
OD_FINDINGS: FULL

## 2024-12-17 ASSESSMENT — LID POSITION - PTOSIS
OD_PTOSIS: RUL 1+ 2+
OS_PTOSIS: LUL 1+ 2+

## 2024-12-17 ASSESSMENT — LID POSITION - COMMENTS
OD_COMMENTS: GOOD LEVATOR FUNCTION
OS_COMMENTS: GOOD LEVATOR FUNCTION

## 2024-12-17 ASSESSMENT — SUPERFICIAL PUNCTATE KERATITIS (SPK)
OD_SPK: ABSENT
OS_SPK: ABSENT

## 2024-12-17 ASSESSMENT — LID POSITION - DERMATOCHALASIS
OS_DERMATOCHALASIS: 1+
OD_DERMATOCHALASIS: 1+

## 2025-02-06 ENCOUNTER — OFFICE (OUTPATIENT)
Dept: URBAN - METROPOLITAN AREA CLINIC 109 | Facility: CLINIC | Age: OVER 89
Setting detail: OPHTHALMOLOGY
End: 2025-02-06
Payer: MEDICARE

## 2025-02-06 ENCOUNTER — RX ONLY (RX ONLY)
Age: OVER 89
End: 2025-02-06

## 2025-02-06 DIAGNOSIS — H02.403: ICD-10-CM

## 2025-02-06 DIAGNOSIS — H16.222: ICD-10-CM

## 2025-02-06 DIAGNOSIS — H02.834: ICD-10-CM

## 2025-02-06 DIAGNOSIS — H02.832: ICD-10-CM

## 2025-02-06 DIAGNOSIS — H16.221: ICD-10-CM

## 2025-02-06 DIAGNOSIS — H02.831: ICD-10-CM

## 2025-02-06 DIAGNOSIS — G43.009: ICD-10-CM

## 2025-02-06 DIAGNOSIS — H02.835: ICD-10-CM

## 2025-02-06 DIAGNOSIS — H16.223: ICD-10-CM

## 2025-02-06 DIAGNOSIS — H35.3131: ICD-10-CM

## 2025-02-06 DIAGNOSIS — H53.19: ICD-10-CM

## 2025-02-06 DIAGNOSIS — H18.413: ICD-10-CM

## 2025-02-06 PROCEDURE — 92014 COMPRE OPH EXAM EST PT 1/>: CPT | Performed by: OPHTHALMOLOGY

## 2025-02-06 PROCEDURE — 92083 EXTENDED VISUAL FIELD XM: CPT | Performed by: OPHTHALMOLOGY

## 2025-02-06 ASSESSMENT — LID POSITION - COMMENTS
OS_COMMENTS: GOOD LEVATOR FUNCTION
OD_COMMENTS: GOOD LEVATOR FUNCTION

## 2025-02-06 ASSESSMENT — SUPERFICIAL PUNCTATE KERATITIS (SPK)
OS_SPK: ABSENT
OD_SPK: ABSENT

## 2025-02-06 ASSESSMENT — CONFRONTATIONAL VISUAL FIELD TEST (CVF)
OD_FINDINGS: FULL
OS_FINDINGS: FULL

## 2025-02-06 ASSESSMENT — KERATOMETRY
OD_AXISANGLE_DEGREES: 090
OS_K2POWER_DIOPTERS: 43.50
OD_K1POWER_DIOPTERS: 43.00
OD_K2POWER_DIOPTERS: 43.00
OS_AXISANGLE_DEGREES: 065
OS_K1POWER_DIOPTERS: 43.00

## 2025-02-06 ASSESSMENT — TONOMETRY
OS_IOP_MMHG: 11
OD_IOP_MMHG: 10

## 2025-02-06 ASSESSMENT — REFRACTION_AUTOREFRACTION
OD_AXIS: 74
OS_AXIS: 98
OS_SPHERE: +0.75
OD_CYLINDER: -0.25
OS_CYLINDER: -1.00
OD_SPHERE: +0.25

## 2025-02-06 ASSESSMENT — LID POSITION - DERMATOCHALASIS
OS_DERMATOCHALASIS: 1+
OD_DERMATOCHALASIS: 1+

## 2025-02-06 ASSESSMENT — VISUAL ACUITY
OD_BCVA: 20/20
OS_BCVA: 20/20-1

## 2025-02-06 ASSESSMENT — LID POSITION - PTOSIS
OS_PTOSIS: LUL 1+ 2+
OD_PTOSIS: RUL 1+ 2+

## 2025-02-12 ENCOUNTER — APPOINTMENT (OUTPATIENT)
Dept: NEUROLOGY | Facility: CLINIC | Age: 89
End: 2025-02-12
Payer: MEDICARE

## 2025-02-12 VITALS
WEIGHT: 140 LBS | HEART RATE: 76 BPM | HEIGHT: 64 IN | DIASTOLIC BLOOD PRESSURE: 75 MMHG | BODY MASS INDEX: 23.9 KG/M2 | SYSTOLIC BLOOD PRESSURE: 152 MMHG

## 2025-02-12 DIAGNOSIS — R42 DIZZINESS AND GIDDINESS: ICD-10-CM

## 2025-02-12 DIAGNOSIS — H53.9 UNSPECIFIED VISUAL DISTURBANCE: ICD-10-CM

## 2025-02-12 PROCEDURE — 99204 OFFICE O/P NEW MOD 45 MIN: CPT

## 2025-02-14 ENCOUNTER — NON-APPOINTMENT (OUTPATIENT)
Age: 89
End: 2025-02-14

## 2025-02-19 ENCOUNTER — APPOINTMENT (OUTPATIENT)
Dept: MRI IMAGING | Facility: CLINIC | Age: 89
End: 2025-02-19
Payer: MEDICARE

## 2025-02-19 ENCOUNTER — OUTPATIENT (OUTPATIENT)
Dept: OUTPATIENT SERVICES | Facility: HOSPITAL | Age: 89
LOS: 1 days | End: 2025-02-19
Payer: MEDICARE

## 2025-02-19 DIAGNOSIS — R42 DIZZINESS AND GIDDINESS: ICD-10-CM

## 2025-02-19 DIAGNOSIS — Z95.828 PRESENCE OF OTHER VASCULAR IMPLANTS AND GRAFTS: Chronic | ICD-10-CM

## 2025-02-19 DIAGNOSIS — Z98.890 OTHER SPECIFIED POSTPROCEDURAL STATES: Chronic | ICD-10-CM

## 2025-02-19 DIAGNOSIS — H53.9 UNSPECIFIED VISUAL DISTURBANCE: ICD-10-CM

## 2025-02-19 PROCEDURE — 70548 MR ANGIOGRAPHY NECK W/DYE: CPT | Mod: 26

## 2025-02-19 PROCEDURE — 70548 MR ANGIOGRAPHY NECK W/DYE: CPT

## 2025-02-19 PROCEDURE — 70543 MRI ORBT/FAC/NCK W/O &W/DYE: CPT | Mod: 26

## 2025-02-19 PROCEDURE — 70545 MR ANGIOGRAPHY HEAD W/DYE: CPT | Mod: 26,59

## 2025-02-19 PROCEDURE — 70553 MRI BRAIN STEM W/O & W/DYE: CPT | Mod: 26

## 2025-02-19 PROCEDURE — 70545 MR ANGIOGRAPHY HEAD W/DYE: CPT

## 2025-02-19 PROCEDURE — 70549 MR ANGIOGRAPH NECK W/O&W/DYE: CPT

## 2025-02-19 PROCEDURE — 70553 MRI BRAIN STEM W/O & W/DYE: CPT

## 2025-02-19 PROCEDURE — A9585: CPT

## 2025-02-19 PROCEDURE — 70543 MRI ORBT/FAC/NCK W/O &W/DYE: CPT

## 2025-02-19 PROCEDURE — 70546 MR ANGIOGRAPH HEAD W/O&W/DYE: CPT | Mod: XU

## 2025-02-25 ENCOUNTER — NON-APPOINTMENT (OUTPATIENT)
Age: 89
End: 2025-02-25

## 2025-02-26 ENCOUNTER — OFFICE (OUTPATIENT)
Dept: URBAN - METROPOLITAN AREA CLINIC 109 | Facility: CLINIC | Age: OVER 89
Setting detail: OPHTHALMOLOGY
End: 2025-02-26
Payer: MEDICARE

## 2025-02-26 DIAGNOSIS — H16.223: ICD-10-CM

## 2025-02-26 DIAGNOSIS — H53.19: ICD-10-CM

## 2025-02-26 DIAGNOSIS — H47.20: ICD-10-CM

## 2025-02-26 DIAGNOSIS — H02.403: ICD-10-CM

## 2025-02-26 DIAGNOSIS — H35.40: ICD-10-CM

## 2025-02-26 DIAGNOSIS — H02.831: ICD-10-CM

## 2025-02-26 DIAGNOSIS — G43.009: ICD-10-CM

## 2025-02-26 DIAGNOSIS — H16.221: ICD-10-CM

## 2025-02-26 DIAGNOSIS — H02.834: ICD-10-CM

## 2025-02-26 DIAGNOSIS — H16.222: ICD-10-CM

## 2025-02-26 DIAGNOSIS — H02.832: ICD-10-CM

## 2025-02-26 DIAGNOSIS — H35.3131: ICD-10-CM

## 2025-02-26 PROCEDURE — 99213 OFFICE O/P EST LOW 20 MIN: CPT | Performed by: OPHTHALMOLOGY

## 2025-02-26 PROCEDURE — 92133 CPTRZD OPH DX IMG PST SGM ON: CPT | Performed by: OPHTHALMOLOGY

## 2025-02-26 ASSESSMENT — CONFRONTATIONAL VISUAL FIELD TEST (CVF)
OD_FINDINGS: FULL
OS_FINDINGS: FULL

## 2025-02-26 ASSESSMENT — LID POSITION - PTOSIS
OS_PTOSIS: LUL 1+ 2+
OD_PTOSIS: RUL 1+ 2+

## 2025-02-26 ASSESSMENT — LID POSITION - COMMENTS
OS_COMMENTS: GOOD LEVATOR FUNCTION
OD_COMMENTS: GOOD LEVATOR FUNCTION

## 2025-02-26 ASSESSMENT — TONOMETRY
OD_IOP_MMHG: 11
OS_IOP_MMHG: 12

## 2025-02-26 ASSESSMENT — LID POSITION - DERMATOCHALASIS
OD_DERMATOCHALASIS: 1+
OS_DERMATOCHALASIS: 1+

## 2025-02-26 ASSESSMENT — SUPERFICIAL PUNCTATE KERATITIS (SPK)
OS_SPK: ABSENT
OD_SPK: ABSENT

## 2025-02-27 ASSESSMENT — KERATOMETRY
OS_K2POWER_DIOPTERS: 43.50
OD_K1POWER_DIOPTERS: 43.00
OS_AXISANGLE_DEGREES: 065
OD_K2POWER_DIOPTERS: 43.00
OD_AXISANGLE_DEGREES: 090
OS_K1POWER_DIOPTERS: 43.00

## 2025-02-27 ASSESSMENT — REFRACTION_AUTOREFRACTION
OS_SPHERE: +0.75
OD_CYLINDER: -0.25
OD_AXIS: 74
OD_SPHERE: +0.25
OS_AXIS: 98
OS_CYLINDER: -1.00

## 2025-02-27 ASSESSMENT — VISUAL ACUITY
OD_BCVA: 20/20
OS_BCVA: 20/20

## 2025-03-06 ENCOUNTER — OFFICE (OUTPATIENT)
Facility: LOCATION | Age: OVER 89
Setting detail: OPHTHALMOLOGY
End: 2025-03-06
Payer: MEDICARE

## 2025-03-06 DIAGNOSIS — H53.433: ICD-10-CM

## 2025-03-06 DIAGNOSIS — H35.3131: ICD-10-CM

## 2025-03-06 DIAGNOSIS — H18.413: ICD-10-CM

## 2025-03-06 DIAGNOSIS — H02.403: ICD-10-CM

## 2025-03-06 DIAGNOSIS — G43.009: ICD-10-CM

## 2025-03-06 DIAGNOSIS — H53.19: ICD-10-CM

## 2025-03-06 DIAGNOSIS — H43.813: ICD-10-CM

## 2025-03-06 PROCEDURE — 92014 COMPRE OPH EXAM EST PT 1/>: CPT | Performed by: OPHTHALMOLOGY

## 2025-03-06 PROCEDURE — 92133 CPTRZD OPH DX IMG PST SGM ON: CPT | Performed by: OPHTHALMOLOGY

## 2025-03-06 PROCEDURE — 92083 EXTENDED VISUAL FIELD XM: CPT | Performed by: OPHTHALMOLOGY

## 2025-03-06 ASSESSMENT — KERATOMETRY
OS_K2POWER_DIOPTERS: 43.00
OS_K1POWER_DIOPTERS: 42.50
OS_AXISANGLE_DEGREES: 037
OD_K2POWER_DIOPTERS: 43.00
OD_K1POWER_DIOPTERS: 42.25
OD_AXISANGLE_DEGREES: 169

## 2025-03-06 ASSESSMENT — CONFRONTATIONAL VISUAL FIELD TEST (CVF)
OS_FINDINGS: FULL
OD_FINDINGS: FULL

## 2025-03-06 ASSESSMENT — LID POSITION - PTOSIS
OD_PTOSIS: RUL 1+ 2+
OS_PTOSIS: LUL 1+ 2+

## 2025-03-06 ASSESSMENT — LID POSITION - DERMATOCHALASIS
OS_DERMATOCHALASIS: 1+
OD_DERMATOCHALASIS: 1+

## 2025-03-06 ASSESSMENT — REFRACTION_AUTOREFRACTION
OS_CYLINDER: -1.50
OD_AXIS: 009
OD_CYLINDER: -1.00
OS_AXIS: 090
OS_SPHERE: +1.50
OD_SPHERE: +0.25

## 2025-03-06 ASSESSMENT — SUPERFICIAL PUNCTATE KERATITIS (SPK)
OD_SPK: ABSENT
OS_SPK: ABSENT

## 2025-03-06 ASSESSMENT — LID POSITION - COMMENTS
OS_COMMENTS: GOOD LEVATOR FUNCTION
OD_COMMENTS: GOOD LEVATOR FUNCTION

## 2025-03-06 ASSESSMENT — VISUAL ACUITY
OS_BCVA: 20/20-2
OD_BCVA: 20/25-2

## 2025-03-27 ENCOUNTER — OFFICE (OUTPATIENT)
Dept: URBAN - METROPOLITAN AREA CLINIC 109 | Facility: CLINIC | Age: OVER 89
Setting detail: OPHTHALMOLOGY
End: 2025-03-27
Payer: MEDICARE

## 2025-03-27 DIAGNOSIS — G43.009: ICD-10-CM

## 2025-03-27 DIAGNOSIS — H16.222: ICD-10-CM

## 2025-03-27 DIAGNOSIS — H02.831: ICD-10-CM

## 2025-03-27 DIAGNOSIS — H35.3131: ICD-10-CM

## 2025-03-27 DIAGNOSIS — H16.221: ICD-10-CM

## 2025-03-27 DIAGNOSIS — H53.433: ICD-10-CM

## 2025-03-27 DIAGNOSIS — H02.403: ICD-10-CM

## 2025-03-27 DIAGNOSIS — H02.832: ICD-10-CM

## 2025-03-27 DIAGNOSIS — H53.19: ICD-10-CM

## 2025-03-27 DIAGNOSIS — H43.813: ICD-10-CM

## 2025-03-27 DIAGNOSIS — H16.223: ICD-10-CM

## 2025-03-27 DIAGNOSIS — H35.40: ICD-10-CM

## 2025-03-27 PROCEDURE — 92014 COMPRE OPH EXAM EST PT 1/>: CPT | Performed by: OPHTHALMOLOGY

## 2025-03-27 ASSESSMENT — KERATOMETRY
OS_K2POWER_DIOPTERS: 43.00
OD_K2POWER_DIOPTERS: 43.00
OD_AXISANGLE_DEGREES: 169
OS_K1POWER_DIOPTERS: 42.50
OS_AXISANGLE_DEGREES: 037
OD_K1POWER_DIOPTERS: 42.25

## 2025-03-27 ASSESSMENT — VISUAL ACUITY
OD_BCVA: 20/20
OS_BCVA: 20/20

## 2025-03-27 ASSESSMENT — REFRACTION_AUTOREFRACTION
OD_CYLINDER: -0.75
OD_AXIS: 22
OD_SPHERE: 0.00
OS_AXIS: 92
OS_SPHERE: +0.50
OS_CYLINDER: -0.50

## 2025-03-27 ASSESSMENT — LID POSITION - PTOSIS
OS_PTOSIS: LUL 1+ 2+
OD_PTOSIS: RUL 1+ 2+

## 2025-03-27 ASSESSMENT — LID POSITION - COMMENTS
OD_COMMENTS: GOOD LEVATOR FUNCTION
OS_COMMENTS: GOOD LEVATOR FUNCTION

## 2025-03-27 ASSESSMENT — TONOMETRY
OS_IOP_MMHG: 11
OD_IOP_MMHG: 11

## 2025-03-27 ASSESSMENT — SUPERFICIAL PUNCTATE KERATITIS (SPK)
OS_SPK: ABSENT
OD_SPK: ABSENT

## 2025-03-27 ASSESSMENT — LID POSITION - DERMATOCHALASIS
OS_DERMATOCHALASIS: 1+
OD_DERMATOCHALASIS: 1+

## 2025-03-27 ASSESSMENT — CONFRONTATIONAL VISUAL FIELD TEST (CVF)
OD_FINDINGS: FULL
OS_FINDINGS: FULL

## 2025-07-08 ENCOUNTER — OFFICE (OUTPATIENT)
Dept: URBAN - METROPOLITAN AREA CLINIC 109 | Facility: CLINIC | Age: OVER 89
Setting detail: OPHTHALMOLOGY
End: 2025-07-08
Payer: MEDICARE

## 2025-07-08 DIAGNOSIS — H16.223: ICD-10-CM

## 2025-07-08 DIAGNOSIS — H53.433: ICD-10-CM

## 2025-07-08 DIAGNOSIS — H16.222: ICD-10-CM

## 2025-07-08 DIAGNOSIS — H16.221: ICD-10-CM

## 2025-07-08 DIAGNOSIS — H02.403: ICD-10-CM

## 2025-07-08 DIAGNOSIS — H02.831: ICD-10-CM

## 2025-07-08 DIAGNOSIS — H35.40: ICD-10-CM

## 2025-07-08 DIAGNOSIS — H53.19: ICD-10-CM

## 2025-07-08 DIAGNOSIS — H35.3131: ICD-10-CM

## 2025-07-08 DIAGNOSIS — H43.813: ICD-10-CM

## 2025-07-08 DIAGNOSIS — G43.009: ICD-10-CM

## 2025-07-08 DIAGNOSIS — H02.832: ICD-10-CM

## 2025-07-08 PROCEDURE — 92014 COMPRE OPH EXAM EST PT 1/>: CPT | Performed by: OPHTHALMOLOGY

## 2025-07-08 PROCEDURE — 92134 CPTRZ OPH DX IMG PST SGM RTA: CPT | Performed by: OPHTHALMOLOGY

## 2025-07-08 ASSESSMENT — VISUAL ACUITY
OD_BCVA: 20/20
OS_BCVA: 20/20

## 2025-07-08 ASSESSMENT — KERATOMETRY
OD_K1POWER_DIOPTERS: 42.25
OD_K2POWER_DIOPTERS: 43.00
OS_AXISANGLE_DEGREES: 037
OD_AXISANGLE_DEGREES: 169
OS_K2POWER_DIOPTERS: 43.00
OS_K1POWER_DIOPTERS: 42.50

## 2025-07-08 ASSESSMENT — LID POSITION - PTOSIS
OS_PTOSIS: LUL 1+ 2+
OD_PTOSIS: RUL 1+ 2+

## 2025-07-08 ASSESSMENT — LID POSITION - COMMENTS
OD_COMMENTS: GOOD LEVATOR FUNCTION
OS_COMMENTS: GOOD LEVATOR FUNCTION

## 2025-07-08 ASSESSMENT — TONOMETRY
OD_IOP_MMHG: 11
OS_IOP_MMHG: 13

## 2025-07-08 ASSESSMENT — SUPERFICIAL PUNCTATE KERATITIS (SPK)
OD_SPK: ABSENT
OS_SPK: ABSENT

## 2025-07-08 ASSESSMENT — CONFRONTATIONAL VISUAL FIELD TEST (CVF)
OD_FINDINGS: FULL
OS_FINDINGS: FULL

## 2025-07-08 ASSESSMENT — REFRACTION_AUTOREFRACTION
OD_CYLINDER: -0.75
OD_SPHERE: 0.00
OS_AXIS: 92
OD_AXIS: 22
OS_SPHERE: +0.50
OS_CYLINDER: -0.50

## 2025-07-08 ASSESSMENT — LID POSITION - DERMATOCHALASIS
OS_DERMATOCHALASIS: 1+
OD_DERMATOCHALASIS: 1+

## (undated) DEVICE — CONNECTOR STRAIGHT 3/8 X 3/8" W LUER LOCK

## (undated) DEVICE — WARMING BLANKET DUO-THERM HYPER/HYPOTHERM ADULT

## (undated) DEVICE — FOLEY TRAY 16FR 5CC LF LUBRISIL ADVANCE TEMP CLOSED

## (undated) DEVICE — GLV 8.5 PROTEXIS (WHITE)

## (undated) DEVICE — DRAPE TOWEL BLUE 17" X 24"

## (undated) DEVICE — GLV 8 RADIATION

## (undated) DEVICE — GLV 8 PROTEGRITY

## (undated) DEVICE — BLADE SCALPEL SAFETYLOCK #11

## (undated) DEVICE — DRSG OPSITE 13.75 X 4"

## (undated) DEVICE — SUT POLYSORB 2-0 30" GS-21 UNDYED

## (undated) DEVICE — POSITIONER FOAM EGG CRATE ULNAR 2PCS (PINK)

## (undated) DEVICE — CONNECTOR STRAIGHT 3/8 X 1/2"

## (undated) DEVICE — BLADE SCALPEL SAFETYLOCK #15

## (undated) DEVICE — GOWN TRIMAX XXL

## (undated) DEVICE — DRAPE C ARM UNIVERSAL

## (undated) DEVICE — TOURNIQUET SET 12FR (1 RED, 1 BLUE, 1 SNARE) 7"

## (undated) DEVICE — GOWN TRIMAX LG

## (undated) DEVICE — SOL IRR POUR H2O 250ML

## (undated) DEVICE — WARMING BLANKET FULL UNDERBODY

## (undated) DEVICE — GLV 7.5 PROTEXIS (WHITE)

## (undated) DEVICE — PREP CHLORAPREP HI-LITE ORANGE 26ML

## (undated) DEVICE — DRAPE IOBAN 23" X 23"

## (undated) DEVICE — GLV 7 PROTEXIS (WHITE)

## (undated) DEVICE — DRAPE 3/4 SHEET W REINFORCEMENT 56X77"

## (undated) DEVICE — SYS RAIL STABILIZER

## (undated) DEVICE — STOPCOCK 3-WAY

## (undated) DEVICE — ELCTR BOVIE TIP BLADE VALLEYLAB 6.5"

## (undated) DEVICE — LAP PAD 18 X 18"

## (undated) DEVICE — PACK BASIN SPECIAL PROCEDURE

## (undated) DEVICE — SYR ASEPTO

## (undated) DEVICE — BLADE SCALPEL SAFETYLOCK #10

## (undated) DEVICE — TUBING SUCTION 20FT

## (undated) DEVICE — VISITEC 4X4

## (undated) DEVICE — ELCTR REM POLYHESIVE ADULT PT RETURN 15FT

## (undated) DEVICE — GOWN SLEEVES

## (undated) DEVICE — DRSG OPSITE 2.5 X 2"

## (undated) DEVICE — DRAPE FEMORAL ANGIOGRAPHY W TROUGH

## (undated) DEVICE — SOL NORMOSOL-R PH7.4 1000ML

## (undated) DEVICE — MEDICATION LABELS W MARKER

## (undated) DEVICE — DRSG TEGADERM 6"X8"

## (undated) DEVICE — SAW BLADE MICROAIRE STERNUM 1X34X9.4MM

## (undated) DEVICE — SAW BLADE MICROAIRE STERNUM 1.1X50X42MM

## (undated) DEVICE — GLV 8 PROTEXIS (WHITE)

## (undated) DEVICE — SOL IRR POUR NS 0.9% 500ML

## (undated) DEVICE — DRAPE 1/2 SHEET 40X57"

## (undated) DEVICE — ELCTR HEX BLADE